# Patient Record
(demographics unavailable — no encounter records)

---

## 2025-04-09 NOTE — HISTORY OF PRESENT ILLNESS
[FreeTextEntry1] : COLE is a 1-month-old male who was referred for cardiac evaluation due to the family history of Hypertrophic Cardiomyopathy at age 17 after a routine EKG. (He had genetic tasting last summer through [facility] last year that was inconclusive). COLE has had no cardiac complaints.  Specifically, there has been no chest pain, palpitations, excessive diaphoresis, shortness of breath, lightheadedness, or syncope. There has been no recent change in activity level, no fatigue, and no difficulty gaining weight or weight loss. COLE is active and thriving.  BW 8 pounds 10 ounces Bottle fed with breast milk. He takes 4 ounces per feed  Additional family history:  There is an older sister who was evaluated by Dr. Wayne who is normal so far.  Mom has hypothyroidism PGF  age 69 from presumed MI (Overweight, A fib) MGM (Afib and HTN) MGF (Renal cell carcinoma, HTN)

## 2025-04-09 NOTE — REVIEW OF SYSTEMS
[Acting Fussy] : not acting ~L fussy [Fever] : no fever [Wgt Loss (___ Lbs)] : no recent weight loss [Pallor] : not pale [Discharge] : no discharge [Redness] : no redness [Nasal Discharge] : no nasal discharge [Nasal Stuffiness] : no nasal congestion [Stridor] : no stridor [Cyanosis] : no cyanosis [Edema] : no edema [Diaphoresis] : not diaphoretic [Tachypnea] : not tachypneic [Wheezing] : no wheezing [Cough] : no cough [Being A Poor Eater] : not a poor eater [Vomiting] : no vomiting [Diarrhea] : no diarrhea [Decrease In Appetite] : appetite not decreased [Fainting (Syncope)] : no fainting [Dec Consciousness] :  no decrease in consciousness [Seizure] : no seizures [Hypotonicity (Flaccid)] : not hypotonic [Refusal to Bear Wgt] : normal weight bearing [Puffy Hands/Feet] : no hand/feet puffiness [Rash] : no rash [Hemangioma] : no hemangioma [Jaundice] : no jaundice [Wound problems] : no wound problems [Bruising] : no tendency for easy bruising [Swollen Glands] : no lymphadenopathy [Enlarged [place]] : the fontanelle was not enlarged [Hoarse Cry] : no hoarse cry [Failure To Thrive] : no failure to thrive [Undescended Testes] : no undescended testicle [Ambiguous Genitals] : genitals not ambiguous [Dec Urine Output] : no oliguria [Solid Foods] : No solid food at this time

## 2025-04-09 NOTE — CONSULT LETTER
[FreeTextEntry4] : Mary Giles, NP [FreeTextEntry5] : [address] [FreeTextEntry6] : [place] [id number] [de-identified] : Barry E. Goldberg, MD FACC, FAAP, FACE [facility] Chief Pediatric Cardiology

## 2025-04-09 NOTE — PHYSICAL EXAM
[Nasal Cavity] : the nasal mucosa was normal [Oropharynx] : the oropharynx was normal [No Cough] : no cough [Stridor] : no stridor was observed

## 2025-04-09 NOTE — DISCUSSION/SUMMARY
[FreeTextEntry1] : In summary COLE's  workup revealed: The left ventricle appeared qualitatively hypertrophied from parasternal short axis but measures within the limits of normal. It appeared normal from apical four chamber view. Trivial tricuspid valve regurgitation, peak systolic instantaneous gradient 18.0 mmHg. Acceleration of right pulmonary artery flow velocity and acceleration of left pulmonary artery flow velocity. Trivial left patent ductus arteriosus with continuous left to right shunt.   He does not require any restrictions from a cardiac standpoint.  He does not require antibiotic prophylaxis from a cardiac standpoint. He should continue with his routine pediatric care.   I reviewed his dad's genetic testing. Dad had two VUS (both in silico thought to be deleterious) I reached out to Dr. Castellon to see if Genetic testing would be helpful in COLE's work up.  Thank you for allowing me to participate in COLE's  care.

## 2025-04-09 NOTE — CONSULT LETTER
[FreeTextEntry4] : Mary Giles, NP [FreeTextEntry5] : [address] [FreeTextEntry6] : [place] [id number] [de-identified] : Barry E. Goldberg, MD FACC, FAAP, FACE [facility] Chief Pediatric Cardiology

## 2025-04-09 NOTE — HISTORY OF PRESENT ILLNESS
[FreeTextEntry1] : COLE is a 1-month-old male who was referred for cardiac evaluation due to the family history of Hypertrophic Cardiomyopathy at age 17 after a routine EKG. (He had genetic tasting last summer through [facility] last year that was inconclusive). COLE has had no cardiac complaints.  Specifically, there has been no chest pain, palpitations, excessive diaphoresis, shortness of breath, lightheadedness, or syncope. There has been no recent change in activity level, no fatigue, and no difficulty gaining weight or weight loss. COLE is active and thriving.  BW 8 pounds 10 ounces Bottle fed with breast milk. He takes 4 ounces per feed  Additional family history:  There is an older sister who was evaluated by Dr. Wayne who is normal so far.  Mom has hypothyroidism PGF  age 69 from presumed MI (Overweight, A fib) MGM (Afib and HTN) MGF (Renal cell carcinoma, HTN)

## 2025-04-09 NOTE — REVIEW OF SYSTEMS
[Acting Fussy] : not acting ~L fussy [Fever] : no fever [Wgt Loss (___ Lbs)] : no recent weight loss [Pallor] : not pale [Discharge] : no discharge [Redness] : no redness [Nasal Discharge] : no nasal discharge [Nasal Stuffiness] : no nasal congestion [Stridor] : no stridor [Cyanosis] : no cyanosis [Edema] : no edema [Diaphoresis] : not diaphoretic [Tachypnea] : not tachypneic [Wheezing] : no wheezing [Cough] : no cough [Being A Poor Eater] : not a poor eater [Vomiting] : no vomiting [Diarrhea] : no diarrhea [Decrease In Appetite] : appetite not decreased [Fainting (Syncope)] : no fainting [Dec Consciousness] :  no decrease in consciousness [Seizure] : no seizures [Hypotonicity (Flaccid)] : not hypotonic [Refusal to Bear Wgt] : normal weight bearing [Puffy Hands/Feet] : no hand/feet puffiness [Rash] : no rash [Hemangioma] : no hemangioma [Jaundice] : no jaundice [Wound problems] : no wound problems [Bruising] : no tendency for easy bruising [Swollen Glands] : no lymphadenopathy [Enlarged [place]] : the fontanelle was not enlarged [Hoarse Cry] : no hoarse cry [Failure To Thrive] : no failure to thrive [Undescended Testes] : no undescended testicle [Ambiguous Genitals] : genitals not ambiguous [Dec Urine Output] : no oliguria [Solid Foods] : No solid food at this time

## 2025-04-09 NOTE — CARDIOLOGY SUMMARY
[FreeTextEntry1] : Normal Sinus Rhythm Right Axis Deviation Early Repolarization QTc 425-450 ms [FreeTextEntry2] : Summary: 1. Normal left ventricular size, morphology and systolic function. 2. (The left ventricle appeared qualitatively hypertrophied from parasternal short axis but measures within the limits of normal. It appeared normal from apical four chamber view.). 3. Trivial tricuspid valve regurgitation, peak systolic instantaneous gradient 18.0 mmHg. 4. Acceleration of right pulmonary artery flow velocity and acceleration of left pulmonary artery flow velocity. 5. Trivial left patent ductus arteriosus with continuous left to right shunt. 6. No pericardial effusion.